# Patient Record
Sex: FEMALE | HISPANIC OR LATINO | URBAN - METROPOLITAN AREA
[De-identification: names, ages, dates, MRNs, and addresses within clinical notes are randomized per-mention and may not be internally consistent; named-entity substitution may affect disease eponyms.]

---

## 2018-08-29 ENCOUNTER — APPOINTMENT (OUTPATIENT)
Age: 58
Setting detail: DERMATOLOGY
End: 2018-08-29

## 2018-08-29 DIAGNOSIS — Z85.828 PERSONAL HISTORY OF OTHER MALIGNANT NEOPLASM OF SKIN: ICD-10-CM

## 2018-08-29 DIAGNOSIS — Z71.89 OTHER SPECIFIED COUNSELING: ICD-10-CM

## 2018-08-29 DIAGNOSIS — L81.4 OTHER MELANIN HYPERPIGMENTATION: ICD-10-CM

## 2018-08-29 DIAGNOSIS — Z86.006 PERSONAL HISTORY OF MELANOMA IN-SITU: ICD-10-CM

## 2018-08-29 PROCEDURE — OTHER COUNSELING: OTHER

## 2018-08-29 PROCEDURE — OTHER MIPS QUALITY: OTHER

## 2018-08-29 PROCEDURE — OTHER REASSURANCE: OTHER

## 2018-08-29 PROCEDURE — OTHER OBSERVATION: OTHER

## 2018-08-29 ASSESSMENT — LOCATION SIMPLE DESCRIPTION DERM
LOCATION SIMPLE: LEFT PRETIBIAL REGION
LOCATION SIMPLE: RIGHT FOREARM
LOCATION SIMPLE: LEFT FOREARM
LOCATION SIMPLE: NOSE

## 2018-08-29 ASSESSMENT — LOCATION ZONE DERM
LOCATION ZONE: LEG
LOCATION ZONE: NOSE
LOCATION ZONE: ARM

## 2018-08-29 ASSESSMENT — LOCATION DETAILED DESCRIPTION DERM
LOCATION DETAILED: LEFT DISTAL DORSAL FOREARM
LOCATION DETAILED: LEFT PROXIMAL PRETIBIAL REGION
LOCATION DETAILED: RIGHT DISTAL DORSAL FOREARM
LOCATION DETAILED: NASAL DORSUM

## 2018-08-29 NOTE — PROCEDURE: MIPS QUALITY
Quality 226: Preventive Care And Screening: Tobacco Use: Screening And Cessation Intervention: Patient screened for tobacco and is an ex-smoker
Quality 138: Melanoma: Coordination Of Care: A treatment plan was communicated to the physicians providing continuing care within one month of diagnosis outlining: diagnosis, tumor thickness and a plan for surgery or alternate care.
Quality 224: Stage 0-Iic Melanoma: Overutilization Of Imaging Studies For Only Stage 0-Iic Melanoma: None of the following diagnostic imaging studies ordered: chest X-ray, CT, Ultrasound, MRI, PET, or nuclear medicine scans (ML)
Quality 110: Preventive Care And Screening: Influenza Immunization: Influenza immunization was not ordered or administered, reason not given
Quality 397: Melanoma: Reporting: The pathology report includes a pT Category and statement on thickness and ulceration for pT1, mitotic rate.
Detail Level: Detailed
Quality 137: Melanoma: Continuity Of Care - Recall System: Patient information entered into a recall system that includes: target date for the next exam specified AND a process to follow up with patients regarding missed or unscheduled appointments
Quality 431: Preventive Care And Screening: Unhealthy Alcohol Use - Screening: Patient screened for unhealthy alcohol use using a single question and scores less than 2 times per year
Quality 130: Documentation Of Current Medications In The Medical Record: Current Medications Documented

## 2018-08-29 NOTE — PROCEDURE: REASSURANCE
Hide Include Location In Plan Question?: No
Include Location In Plan?: Yes
Detail Level: Generalized

## 2021-01-06 ENCOUNTER — OFFICE VISIT (OUTPATIENT)
Dept: INTERNAL MEDICINE CLINIC | Age: 61
End: 2021-01-06
Payer: COMMERCIAL

## 2021-01-06 ENCOUNTER — DOCUMENTATION ONLY (OUTPATIENT)
Dept: INTERNAL MEDICINE CLINIC | Age: 61
End: 2021-01-06

## 2021-01-06 VITALS
HEART RATE: 50 BPM | HEIGHT: 69 IN | DIASTOLIC BLOOD PRESSURE: 81 MMHG | RESPIRATION RATE: 16 BRPM | OXYGEN SATURATION: 98 % | SYSTOLIC BLOOD PRESSURE: 130 MMHG | WEIGHT: 207.2 LBS | TEMPERATURE: 97.5 F | BODY MASS INDEX: 30.69 KG/M2

## 2021-01-06 DIAGNOSIS — Z23 ENCOUNTER FOR IMMUNIZATION: ICD-10-CM

## 2021-01-06 DIAGNOSIS — Z12.31 ENCOUNTER FOR SCREENING MAMMOGRAM FOR MALIGNANT NEOPLASM OF BREAST: ICD-10-CM

## 2021-01-06 DIAGNOSIS — Z00.00 ROUTINE GENERAL MEDICAL EXAMINATION AT A HEALTH CARE FACILITY: Primary | ICD-10-CM

## 2021-01-06 DIAGNOSIS — E78.5 HYPERLIPIDEMIA, UNSPECIFIED HYPERLIPIDEMIA TYPE: ICD-10-CM

## 2021-01-06 DIAGNOSIS — Z82.49 FAMILY HISTORY OF HEART DISEASE: ICD-10-CM

## 2021-01-06 PROCEDURE — 90750 HZV VACC RECOMBINANT IM: CPT | Performed by: PHYSICIAN ASSISTANT

## 2021-01-06 PROCEDURE — 99386 PREV VISIT NEW AGE 40-64: CPT | Performed by: PHYSICIAN ASSISTANT

## 2021-01-06 PROCEDURE — 90471 IMMUNIZATION ADMIN: CPT | Performed by: PHYSICIAN ASSISTANT

## 2021-01-06 NOTE — PROGRESS NOTES
Deberah Osgood is a 56 y.o. female (: 1960) presenting to address:    Chief Complaint   Patient presents with    New Patient    Establish Care       Vitals:    21 1120   BP: 130/81   Pulse: (!) 50   Resp: 16   Temp: 97.5 °F (36.4 °C)   TempSrc: Oral   SpO2: 98%   Weight: 207 lb 3.2 oz (94 kg)   Height: 5' 8.5\" (1.74 m)   PainSc:   0 - No pain       Hearing/Vision:   No exam data present    Learning Assessment:     Learning Assessment 2021   PRIMARY LEARNER Patient   HIGHEST LEVEL OF EDUCATION - PRIMARY LEARNER  > 4 YEARS OF COLLEGE   BARRIERS PRIMARY LEARNER NONE   CO-LEARNER CAREGIVER No   PRIMARY LANGUAGE ENGLISH   LEARNER PREFERENCE PRIMARY READING   ANSWERED BY patient   RELATIONSHIP SELF     Depression Screening:     3 most recent PHQ Screens 2021   Little interest or pleasure in doing things Not at all   Feeling down, depressed, irritable, or hopeless Not at all   Total Score PHQ 2 0     Fall Risk Assessment:   No flowsheet data found. Abuse Screening:   No flowsheet data found. Coordination of Care Questionaire:   1. Have you been to the ER, urgent care clinic since your last visit? Hospitalized since your last visit? NO    2. Have you seen or consulted any other health care providers outside of the [address] [place] [address] since your last visit? Include any pap smears or colon screening. NO    Advanced Directive:   1. Do you have an Advanced Directive? NO    2. Would you like information on Advanced Directives?  NO

## 2021-01-06 NOTE — PROGRESS NOTES
HISTORY OF PRESENT ILLNESS  Attila Escobar is a 56 y.o. female. HPI  Presents as a new patient to establish care. She will request her prior records. She exercises, monitors her diet, and works to maintain health. She admits to needing to lose some weight. 1) Hx HLD - no medication. She has maintained this thus far with lifestyle. She wishes to recheck her status. Admits to a high fam hx of CAD -- multiple siblings with CAD (stents, [place])    2) Mammogram - 2/3/2020 - [address] [facility] [address], [facility]. Due for upcoming repeat. 3) Hx of smoking - smokes 2-3 cigs/day x ~30 years. Quit ~10 years ago. Inquires about low-dose CT chest screening. Review of Systems   Constitutional: Negative for malaise/fatigue. Respiratory: Negative for shortness of breath. Cardiovascular: Negative for chest pain and leg swelling. Neurological: Negative for dizziness and headaches. Visit Vitals  /81 (BP 1 Location: Right arm, BP Patient Position: Sitting)   Pulse (!) 50   Temp 97.5 °F (36.4 °C) (Oral)   Resp 16   Ht 5' 8.5\" (1.74 m)   Wt 207 lb 3.2 oz (94 kg)   SpO2 98%   BMI 31.05 kg/m²       Physical Exam  Constitutional:       General: She is not in acute distress. Appearance: Normal appearance. She is well-developed. HENT:      Head: Normocephalic and atraumatic. Right Ear: Tympanic membrane, ear canal and external ear normal.      Left Ear: Tympanic membrane, ear canal and external ear normal.      Nose: Nose normal.      Mouth/Throat:      Comments: Mask  Eyes:      General: No scleral icterus. Conjunctiva/sclera: Conjunctivae normal.      Pupils: Pupils are equal, round, and reactive to light. Neck:      Musculoskeletal: Neck supple. Cardiovascular:      Rate and Rhythm: Normal rate and regular rhythm. Pulses: Normal pulses. Dorsalis pedis pulses are 2+ on the right side and 2+ on the left side.         Posterior tibial pulses are 2+ on the right side and 2+ on the left side. Heart sounds: Normal heart sounds. No murmur. No gallop. Pulmonary:      Effort: Pulmonary effort is normal. No respiratory distress. Breath sounds: Normal breath sounds. No decreased breath sounds, wheezing, rhonchi or rales. Musculoskeletal:      Right lower leg: No edema. Left lower leg: No edema. Lymphadenopathy:      Head:      Right side of head: No submandibular or tonsillar adenopathy. Left side of head: No submandibular or tonsillar adenopathy. Cervical: No cervical adenopathy. Upper Body:      Right upper body: No supraclavicular adenopathy. Left upper body: No supraclavicular adenopathy. Skin:     General: Skin is warm and dry. Neurological:      Mental Status: She is alert and oriented to person, place, and time. Psychiatric:         Speech: Speech normal.         ASSESSMENT and PLAN  Diagnoses and all orders for this visit:    1. Routine general medical examination at a health care facility  -     CBC W/O DIFF; Future  -     METABOLIC PANEL, COMPREHENSIVE; Future  -     HEMOGLOBIN A1C W/O EAG; Future  -     TSH 3RD GENERATION; Future  -     URINALYSIS W/ RFLX MICROSCOPIC; Future  - Record request to prior PCP made. 2. Hyperlipidemia, unspecified hyperlipidemia type  -     LIPID PANEL; Future  -     CRP, HIGH SENSITIVITY; Future  - Will review labs, calculate risk, and consider need of medication. 3. Family history of heart disease  -     CRP, HIGH SENSITIVITY; Future    4. Encounter for screening mammogram for malignant neoplasm of breast  -     [facility]IA [facility]ABEL MAMMO BI SCREENING INCL CAD; Future   - After 2/3/21.    5. Encounter for immunization  -     ZOSTER VACC RECOMBINANT ADJUVANTED  -     CT IMMUNIZ ADMIN,1 SINGLE/COMB VAC/TOXOID      Follow-up and Dispositions    · Return for Schedule WWE.

## 2021-01-07 LAB
A-G RATIO,AGRAT: 2.1 RATIO (ref 1.1–2.6)
ALBUMIN SERPL-MCNC: 4.9 G/DL (ref 3.5–5)
ALP SERPL-CCNC: 73 U/L (ref 40–120)
ALT SERPL-CCNC: 38 U/L (ref 5–40)
ANION GAP SERPL CALC-SCNC: 11 MMOL/L (ref 3–15)
AST SERPL W P-5'-P-CCNC: 30 U/L (ref 10–37)
AVG GLU, 10930: 109 MG/DL (ref 91–123)
BILIRUB SERPL-MCNC: 0.3 MG/DL (ref 0.2–1.2)
BILIRUB UR QL: NEGATIVE
BUN SERPL-MCNC: 15 MG/DL (ref 6–22)
C-REACTIVE PROTEIN, CARDIAC, 120768: 3.45 MG/L
CALCIUM SERPL-MCNC: 9.8 MG/DL (ref 8.4–10.5)
CHLORIDE SERPL-SCNC: 102 MMOL/L (ref 98–110)
CHOLEST SERPL-MCNC: 270 MG/DL (ref 110–200)
CLARITY: CLEAR
CO2 SERPL-SCNC: 28 MMOL/L (ref 20–32)
COLOR UR: YELLOW
CREAT SERPL-MCNC: 0.7 MG/DL (ref 0.8–1.4)
ERYTHROCYTE [DISTWIDTH] IN BLOOD BY AUTOMATED COUNT: 12.9 % (ref 10–15.5)
GFRAA, 66117: >60
GFRNA, 66118: >60
GLOBULIN,GLOB: 2.3 G/DL (ref 2–4)
GLUCOSE SERPL-MCNC: 96 MG/DL (ref 70–99)
GLUCOSE UR QL: NEGATIVE MG/DL
HBA1C MFR BLD HPLC: 5.4 % (ref 4.8–5.6)
HCT VFR BLD AUTO: 43.2 % (ref 35.1–48)
HDLC SERPL-MCNC: 4.2 MG/DL (ref 0–5)
HDLC SERPL-MCNC: 64 MG/DL
HGB BLD-MCNC: 13.3 G/DL (ref 11.7–16)
HGB UR QL STRIP: NEGATIVE
KETONES UR QL STRIP.AUTO: NEGATIVE MG/DL
LDL/HDL RATIO,LDHD: 2.9
LDLC SERPL CALC-MCNC: 186 MG/DL (ref 50–99)
LEUKOCYTE ESTERASE: NEGATIVE
MCH RBC QN AUTO: 28 PG (ref 26–34)
MCHC RBC AUTO-ENTMCNC: 31 G/DL (ref 31–36)
MCV RBC AUTO: 92 FL (ref 81–99)
NITRITE UR QL STRIP.AUTO: NEGATIVE
NON-HDL CHOLESTEROL, 011976: 206 MG/DL
PH UR STRIP: 6 PH (ref 5–8)
PLATELET # BLD AUTO: 280 K/UL (ref 140–440)
PMV BLD AUTO: 11.2 FL (ref 9–13)
POTASSIUM SERPL-SCNC: 4.7 MMOL/L (ref 3.5–5.5)
PROT SERPL-MCNC: 7.2 G/DL (ref 6.2–8.1)
PROT UR QL STRIP: NEGATIVE MG/DL
RBC # BLD AUTO: 4.69 M/UL (ref 3.8–5.2)
SODIUM SERPL-SCNC: 141 MMOL/L (ref 133–145)
SP GR UR: 1.01 (ref 1–1.03)
TRIGL SERPL-MCNC: 97 MG/DL (ref 40–149)
TSH SERPL DL<=0.005 MIU/L-ACNC: 2.98 MCU/ML (ref 0.27–4.2)
URINE ASCORBIC ACID: NEGATIVE MG/DL
UROBILINOGEN UR STRIP-MCNC: <2 MG/DL
VLDLC SERPL CALC-MCNC: 19 MG/DL (ref 8–30)
WBC # BLD AUTO: 4.8 K/UL (ref 4–11)

## 2021-01-13 ENCOUNTER — TELEPHONE (OUTPATIENT)
Dept: INTERNAL MEDICINE CLINIC | Age: 61
End: 2021-01-13

## 2021-01-13 NOTE — TELEPHONE ENCOUNTER
Called patient and discussed results. Given elevated LDL, non-HDL, and hs-CRP, I strongly recommend statin medication for prevention of CAD, particularly in lieu of patient's family history. Lab was nonfasting, and patient has just implemented dietary changes for weight loss. She would like to recheck a fasting lipid panel soon along with weight loss prior to medication initiation. However, she will consider the medication. If LDL remains elevated, she will proceed with medication. We agree to recheck a fasting lipid in March (2 months). She will let me know if she would like to reconsider medication or if she would like to repeat a fasting panel sooner.

## 2021-04-30 ENCOUNTER — OFFICE VISIT (OUTPATIENT)
Dept: INTERNAL MEDICINE CLINIC | Age: 61
End: 2021-04-30
Payer: COMMERCIAL

## 2021-04-30 VITALS
HEIGHT: 69 IN | SYSTOLIC BLOOD PRESSURE: 122 MMHG | DIASTOLIC BLOOD PRESSURE: 80 MMHG | HEART RATE: 54 BPM | RESPIRATION RATE: 18 BRPM | OXYGEN SATURATION: 97 % | BODY MASS INDEX: 29.77 KG/M2 | WEIGHT: 201 LBS | TEMPERATURE: 97.1 F

## 2021-04-30 DIAGNOSIS — Z82.49 FAMILY HISTORY OF CORONARY ARTERY DISEASE IN BROTHER: ICD-10-CM

## 2021-04-30 DIAGNOSIS — Z12.4 SCREENING FOR MALIGNANT NEOPLASM OF CERVIX: ICD-10-CM

## 2021-04-30 DIAGNOSIS — R06.09 DOE (DYSPNEA ON EXERTION): ICD-10-CM

## 2021-04-30 DIAGNOSIS — Z23 ENCOUNTER FOR IMMUNIZATION: ICD-10-CM

## 2021-04-30 DIAGNOSIS — E78.5 HYPERLIPIDEMIA, UNSPECIFIED HYPERLIPIDEMIA TYPE: ICD-10-CM

## 2021-04-30 DIAGNOSIS — Z01.419 WELL WOMAN EXAM WITH ROUTINE GYNECOLOGICAL EXAM: Primary | ICD-10-CM

## 2021-04-30 PROCEDURE — 90750 HZV VACC RECOMBINANT IM: CPT | Performed by: PHYSICIAN ASSISTANT

## 2021-04-30 PROCEDURE — 90471 IMMUNIZATION ADMIN: CPT | Performed by: PHYSICIAN ASSISTANT

## 2021-04-30 PROCEDURE — 99396 PREV VISIT EST AGE 40-64: CPT | Performed by: PHYSICIAN ASSISTANT

## 2021-04-30 RX ORDER — ROSUVASTATIN CALCIUM 10 MG/1
10 TABLET, COATED ORAL
Qty: 90 TAB | Refills: 3 | Status: SHIPPED | OUTPATIENT
Start: 2021-04-30 | End: 2022-05-23

## 2021-04-30 NOTE — PROGRESS NOTES
Andrea Ingram is a 56 y.o. female (: 1960) presenting to address:    Chief Complaint   Patient presents with    Well Woman       Vitals:    21 0930   BP: 122/80   Pulse: (!) 54   Resp: 18   Temp: 97.1 °F (36.2 °C)   TempSrc: Oral   SpO2: 97%   Weight: 201 lb (91.2 kg)   Height: 5' 8.5\" (1.74 m)   PainSc:   0 - No pain       Hearing/Vision:   No exam data present    Learning Assessment:     Learning Assessment 2021   PRIMARY LEARNER Patient   HIGHEST LEVEL OF EDUCATION - PRIMARY LEARNER  > 4 YEARS OF COLLEGE   BARRIERS PRIMARY LEARNER NONE   CO-LEARNER CAREGIVER No   PRIMARY LANGUAGE ENGLISH   LEARNER PREFERENCE PRIMARY READING   ANSWERED BY patient   RELATIONSHIP SELF     Depression Screening:     3 most recent PHQ Screens 2021   Little interest or pleasure in doing things Not at all   Feeling down, depressed, irritable, or hopeless Not at all   Total Score PHQ 2 0     Fall Risk Assessment:   No flowsheet data found. Abuse Screening:   No flowsheet data found. Coordination of Care Questionaire:   1. Have you been to the ER, urgent care clinic since your last visit? Hospitalized since your last visit? NO    2. Have you seen or consulted any other health care providers outside of the [address] [place] [address] since your last visit? Include any pap smears or colon screening. YES ENT    Advanced Directive:   1. Do you have an Advanced Directive? NO    2. Would you like information on Advanced Directives?  NO

## 2021-04-30 NOTE — PROGRESS NOTES
Subjective:   56 y.o. female for Well Woman Check. No LMP recorded. Patient is postmenopausal.    Social History: single partner, contraception - none. Pertinent past medical hstory: HLD - she is ready to move forward with medication. See previous note. Fam hx of her brother with CAD -  from massive MI early 63's. Admits to WRIGHT at times. She does exercise daily and admits WRIGHT is worse some times. Denies chest pain. Upcoming mammogram: 21 - [address]  Colonoscopy - reportedly completed in 2018. Will request record. Consulted with [facility]. Past Medical History:   Diagnosis Date    Basal cell adenocarcinoma     nose    Melanoma in situ ([address]     L leg - monitored by TIFFANY Samuel        ROS:  Feeling well. No dyspnea or chest pain on exertion. No abdominal pain, change in bowel habits, black or bloody stools. No urinary tract symptoms. GYN ROS: no breast pain or new or enlarging lumps on self exam, no vaginal bleeding, no discharge or pelvic pain, she complains of vaginal dryness but well-manages this with Vit E vaginal tabs. No neurological complaints. Objective:     Visit Vitals  /80 (BP 1 Location: Right upper arm, BP Patient Position: Sitting, BP Cuff Size: Adult)   Pulse (!) 54   Temp 97.1 °F (36.2 °C) (Oral)   Resp 18   Ht 5' 8.5\" (1.74 m)   Wt 201 lb (91.2 kg)   SpO2 97%   BMI 30.12 kg/m²     The patient appears well, alert, oriented x 3, in no distress. ENT normal.  Neck supple. No adenopathy or thyromegaly. JESSICA. Lungs are clear, good air entry, no wheezes, rhonchi or rales. S1 and S2 normal, no murmurs, regular rate and rhythm. Abdomen soft without tenderness, guarding, mass or organomegaly. Extremities show no edema, normal peripheral pulses. Neurological is normal, no focal findings.     BREAST EXAM: breasts appear normal, no suspicious masses, no skin or nipple changes or axillary nodes    PELVIC EXAM: VULVA: normal appearing vulva with no masses, tenderness or lesions, VAGINA: normal appearing vagina with normal color and discharge, no lesions, CERVIX: normal appearing cervix without discharge or lesions, ectropion of os, UTERUS: uterus is normal size, shape, consistency and nontender, ADNEXA: normal adnexa in size, nontender and no masses, PAP: Pap smear done today, thin-prep method, HPV test, STD screen deferred, exam chaperoned by Ludin Burch MA. Assessment/Plan:   well woman  mammogram  pap smear  counseled on breast self exam and mammography screening  return annually or prn  Diagnoses and all orders for this visit:    1. Well woman exam with routine gynecological exam  Comments:  [R22.31]  2. Screening for malignant neoplasm of cervix  -     PAP IG, APTIMA HPV AND RFX 16/18,45 ([id number]); Future    3. Hyperlipidemia, unspecified hyperlipidemia type  -     LIPID PANEL; Future  -     CRP, HIGH SENSITIVITY; Future  -     rosuvastatin (CRESTOR) 10 mg tablet; Take 1 Tab by mouth nightly. - Start medication. Repeat lab 3 months - orders placed. 4. WRIGHT (dyspnea on exertion)  5. Family history of coronary artery disease in brother  -     ECHO STRESS; Future    6. Encounter for immunization  -     ZOSTER VACC RECOMBINANT ADJUVANTED  -     MD IMMUNIZ ADMIN,1 SINGLE/COMB VAC/TOXOID        Follow-up and Dispositions    · Return in about 3 months (around 7/30/2021) for f/u cholesterol . Justice Archer

## 2021-05-03 LAB
HPV HIGH RISK, 507303: NEGATIVE
PAP IMAGE GUIDED, 8900296: NORMAL

## 2021-05-07 ENCOUNTER — HOSPITAL ENCOUNTER (OUTPATIENT)
Dept: WOMENS IMAGING | Age: 61
Discharge: HOME | End: 2021-05-07
Attending: PHYSICIAN ASSISTANT
Payer: COMMERCIAL

## 2021-05-07 DIAGNOSIS — Z12.31 ENCOUNTER FOR SCREENING MAMMOGRAM FOR MALIGNANT NEOPLASM OF BREAST: ICD-10-CM

## 2021-05-07 DIAGNOSIS — Z12.31 VISIT FOR SCREENING MAMMOGRAM: ICD-10-CM

## 2021-05-07 PROCEDURE — 77063 BREAST TOMOSYNTHESIS BI: CPT

## 2021-07-12 ENCOUNTER — HOSPITAL ENCOUNTER (OUTPATIENT)
Dept: LAB | Age: 61
Discharge: HOME | End: 2021-07-12

## 2021-07-12 LAB — SENTARA SPECIMEN COL,SENBCF: NORMAL

## 2021-07-12 PROCEDURE — 99001 SPECIMEN HANDLING PT-LAB: CPT

## 2021-07-14 ENCOUNTER — VIRTUAL VISIT (OUTPATIENT)
Dept: INTERNAL MEDICINE CLINIC | Age: 61
End: 2021-07-14
Payer: COMMERCIAL

## 2021-07-14 DIAGNOSIS — E78.5 HYPERLIPIDEMIA, UNSPECIFIED HYPERLIPIDEMIA TYPE: Primary | ICD-10-CM

## 2021-07-14 PROCEDURE — 99213 OFFICE O/P EST LOW 20 MIN: CPT | Performed by: PHYSICIAN ASSISTANT

## 2021-07-14 NOTE — PROGRESS NOTES
For virtual visit patient would like to receive link via TEXT/EMAIL: ham Escobedo is a 56 y.o. female (: 1960) evaluated via telephone on 2021 to address:    Chief Complaint   Patient presents with    Cholesterol Problem       There were no vitals filed for this visit. Allergies Reviewed. Learning Assessment:     Learning Assessment 2021   PRIMARY LEARNER Patient   HIGHEST LEVEL OF EDUCATION - PRIMARY LEARNER  > 4 YEARS OF COLLEGE   BARRIERS PRIMARY LEARNER NONE   CO-LEARNER CAREGIVER No   PRIMARY LANGUAGE ENGLISH   LEARNER PREFERENCE PRIMARY READING   ANSWERED BY patient   RELATIONSHIP SELF     Depression Screening:     3 most recent PHQ Screens 2021   Little interest or pleasure in doing things Not at all   Feeling down, depressed, irritable, or hopeless Not at all   Total Score PHQ 2 0     Fall Risk Assessment:   No flowsheet data found. Abuse Screening:   No flowsheet data found. Coordination of Care Questionaire:   1. Have you been to the ER, urgent care clinic since your last visit? Hospitalized since your last visit? NO    2. Have you seen or consulted any other health care providers outside of the [address] [place] [address] since your last visit? Include any pap smears or colon screening. NO    Advanced Directive:   1. Do you have an Advanced Directive? NO    2. Would you like information on Advanced Directives?  NO

## 2021-07-14 NOTE — PROGRESS NOTES
Simone Dial is a 56 y.o. female who was seen by synchronous (real-time) audio-video technology on 7/14/2021 for Cholesterol Problem        Assessment & Plan:     Diagnoses and all orders for this visit:    1. Hyperlipidemia, unspecified hyperlipidemia type  - Very pleased with drastic improvement with Crestor 10 mg. Discussed with patient that due to her family hx, starting lipids, and starting hs-CRP, recommend consideration of dosage increase to 20 mg. She would prefer to continue with her current dosage and re-evaluate this at her 1/2022 CPE. She has Crestor 10 mg refills available to her. Follow-up and Dispositions    · Return in about 6 months (around 1/14/2022) for Schedule CPE. Subjective:     Presents for follow-up hyperlipidemia. Received Crestor 10 mg at her 4/30/21 visit but admits only started this 3 weeks ago. Recently repeated her labs. LDL, non-HDL drastically improved. Hs-CRP also improved - not in intermediate risk range. High fam hx CAD. Hospital Outpatient Visit on 07/12/2021   Component Date Value Ref Range Status    [facility] SPECIMEN COL 07/12/2021 Specimens collected/[facility] to [facility]    Final   Telephone on 07/02/2021   Component Date Value Ref Range Status    C-Reactive Protein, Cardiac 07/12/2021 2.27  mg/L Final    Comment: Consensus reference interval for adults: <5 mg/L    The CDC/AHA recommended the following hsCRP cut-off points (tertiles) for CVD  risk assessment:    < 1.0 mg/L           Low Risk  1.0 - 3.0 mg/ L      Intermediate Risk  >3.0 mg/L            High Risk    Increases in CRP values are non-specific and should not be interpreted without  a complete clinical history. When using hsCRP to assess the risk of coronary heart disease, measurements  should be made on metabolically stable patients and compared to previous  values. Optimally, the average of hsCRP results repeated two weeks apart   should  be used for risk assessment.  When the results are being used for risk  assessment, patients with persistently unexplained hsCRP levels of above 10  mg/L should be evaluated for non cardiovascular origins. Testing for any risk  assessment should not be performed while there is indication of infection,  systemic inflammation or trauma.  AST (SGOT) 07/12/2021 23  10 - 37 U/L Final    ALT (SGPT) 07/12/2021 29  5 - 40 U/L Final    Comment: Test includes ALT, Alkaline Phosphatase, AST, Total Protein, Direct Bilirubin,  Total Bilirubin and Albumin.  Alk. phosphatase 07/12/2021 58  40 - 120 U/L Final    Bilirubin, total 07/12/2021 0.6  0.2 - 1.2 mg/dL Final    Bilirubin, direct 07/12/2021 <0.2  0.0 - 0.3 mg/dL Final    Protein, total 07/12/2021 6.8  6.2 - 8.1 g/dL Final    Albumin 07/12/2021 4.8  3.5 - 5.0 g/dL Final    A-G Ratio 07/12/2021 2.4  1.1 - 2.6 ratio Final    Globulin 07/12/2021 2.0  2.0 - 4.0 g/dL Final    Triglyceride 07/12/2021 72  40 - 149 mg/dL Final    HDL Cholesterol 07/12/2021 58  >=40 mg/dL Final    Cholesterol, total 07/12/2021 167  110 - 200 mg/dL Final    CHOLESTEROL/HDL 07/12/2021 2.9  0.0 - 5.0 Final    Non-HDL Cholesterol 07/12/2021 109  <130 mg/dL Final    LDL, calculated 07/12/2021 95  50 - 99 mg/dL Final    VLDL, calculated 07/12/2021 14  8 - 30 mg/dL Final    LDL/HDL Ratio 07/12/2021 1.6   Final    Comment: Test includes cholesterol, HDL cholesterol, triglycerides and LDL. Cholesterol Recommended NCEP guidelines in mg/dL:    Less than 200            Desirable  200 - 239                Borderline High  Greater than or  = 240   High      Please Note:  Total Chol/HDL Ratio                     Men     Women  1/2 Avg. Risk    3.4     3.3      Avg. Risk    5.0     4.4  2X  Avg. Risk    9.6     7.1  3X  Avg. Risk   23.4    11.0                 Prior to Admission medications    Medication Sig Start Date End Date Taking? Authorizing Provider   COLLAGEN by Does Not Apply route.    Yes Provider, Historical   rosuvastatin (CRESTOR) 10 mg tablet Take 1 Tab by mouth nightly. 4/30/21  Yes TIFFANY Arguelles   OTHER    Yes Provider, Historical      Past Medical History:   Diagnosis Date    Basal cell adenocarcinoma     nose    Melanoma in situ ([facility] [address]     L leg - monitored by TIFFANY Arnold       ROS    Objective:     Patient-Reported Vitals 7/14/2021   Patient-Reported Weight 196lbs        [INSTRUCTIONS:  \"[x]\" Indicates a positive item  \"[]\" Indicates a negative item  -- DELETE ALL ITEMS NOT EXAMINED]    Constitutional: [x] Appears well-developed and well-nourished [x] No apparent distress      [] Abnormal -     Mental status: [x] Alert and awake  [x] Oriented to person/place/time [x] Able to follow commands    [] Abnormal -     Eyes:   EOM    [x]  Normal    [] Abnormal -   Sclera  [x]  Normal    [] Abnormal -          Discharge [x]  None visible   [] Abnormal -     HENT: [x] Normocephalic, atraumatic  [] Abnormal -   [x] Mouth/Throat: Mucous membranes are moist    External Ears [x] Normal  [] Abnormal -    Neck: [x] No visualized mass [] Abnormal -     Pulmonary/Chest: [x] Respiratory effort normal   [x] No visualized signs of difficulty breathing or respiratory distress        [] Abnormal -      Musculoskeletal:   [x] Normal gait with no signs of ataxia         [] Normal range of motion of neck        [] Abnormal -     Neurological:        [x] No Facial Asymmetry (Cranial nerve 7 motor function) (limited exam due to video visit)          [x] No gaze palsy        [] Abnormal -          Skin:        [x] No significant exanthematous lesions or discoloration noted on facial skin         [] Abnormal -            Psychiatric:       [x] Normal Affect [] Abnormal -        [x] No Hallucinations    Other pertinent observable physical exam findings:-        We discussed the expected course, resolution and complications of the diagnosis(es) in detail.   Medication risks, benefits, costs, interactions, and alternatives were discussed as indicated. I advised her to contact the office if her condition worsens, changes or fails to improve as anticipated. She expressed understanding with the diagnosis(es) and plan. Arti Dunham, was evaluated through a synchronous (real-time) audio-video encounter. The patient (or guardian if applicable) is aware that this is a billable service. Verbal consent to proceed has been obtained within the past 12 months. The visit was conducted pursuant to the emergency declaration under the [address] [place] [address], [address] [place] [address] authority and the [facility] General Act. Patient identification was verified, and a caregiver was present when appropriate. The patient was located in a state where the provider was credentialed to provide care.       TIFFANY Jimenez

## 2022-04-18 ENCOUNTER — TRANSCRIBE ORDER (OUTPATIENT)
Dept: SCHEDULING | Age: 62
End: 2022-04-18

## 2022-04-18 DIAGNOSIS — Z12.31 SCREENING MAMMOGRAM, ENCOUNTER FOR: Primary | ICD-10-CM

## 2023-06-26 RX ORDER — ROSUVASTATIN CALCIUM 10 MG/1
TABLET, COATED ORAL
Qty: 90 TABLET | OUTPATIENT
Start: 2023-06-26

## 2024-04-30 NOTE — PROGRESS NOTES
CC: Annual exam     HISTORY OF PRESENT ILLNESS:  Domi is a 23 year old       female, seen today for annual gyn exam.  She has no questions/concerns. She is a new patient to this practice. She does report long standing hair loss since high school. She would like some lab work for this.      Breast cancer screening other than mammogram    2024      Comment: Lifetime risk 11.79% average risk    ADENOIDECTOMY W/ MYRINGOTOMY AND TUBES                        TONSILLECTOMY AND ADENOIDECTOMY                                 Comment: age 6 years  Current Outpatient Medications   Medication Sig Dispense Refill    Romero Fe  1-20 MG-MCG per tablet Take 1 tablet by mouth daily. 84 tablet 4    vitamin - therapeutic multivitamins w/minerals (CENTRUM SILVER,THERA-M) TABS Take 1 tablet by mouth daily.       No current facility-administered medications for this visit.     ALLERGIES:  No Known Allergies    Social History     Socioeconomic History    Marital status: Single     Spouse name: Not on file    Number of children: Not on file    Years of education: Not on file    Highest education level: Not on file   Occupational History    Not on file   Tobacco Use    Smoking status: Never     Passive exposure: Yes    Smokeless tobacco: Never   Vaping Use    Vaping status: never used   Substance and Sexual Activity    Alcohol use: Not on file    Drug use: Never    Sexual activity: Yes     Partners: Male     Birth control/protection: Pill   Other Topics Concern    Not on file   Social History Narrative    Not on file     Social Determinants of Health     Financial Resource Strain: Not on file   Food Insecurity: Not on file   Transportation Needs: Not on file   Physical Activity: Not on file   Stress: Not on file   Social Connections: Not on file   Interpersonal Safety: Not on file      Family History   Problem Relation Age of Onset    Hyperlipidemia Father     Liver Disease Maternal Aunt     Cancer Maternal Grandmother       Shingrix Immunization/s administered 4/30/2021 by Joann Conley with consent. Patient tolerated procedure well. No reactions noted.    breast ca mggm    Diabetes Paternal Grandmother     Diabetes Maternal Grandfather     Heart disease Maternal Grandfather     Cancer, Breast Neg Hx     Cancer, Colon Neg Hx     Cancer, Ovarian Neg Hx     Cancer, Endometrial Neg Hx        PAST GYNECOLOGIC HISTORY:  Patient's last menstrual period was 2024.. Menses are regular , last for 5 days in duration with a normal amount of flow. The patient uses pills for contraception. Last Pap smear was . Patient reports Pap smears have been normal. Regarding sexual activity, she is in a monogamous relationship. The patient denies pain with intercourse or vaginal discharge.    PAST OBSTETRICAL HISTORY:         .      HEALTH MAINTENANCE:  Mammogram n/a  Colonoscopy n/a  Immunizations tdap 2009  DEXA n/a  Screening labs ordered today    REVIEW OF SYSTEMS:    Negative for any significant problems with the following:    General: unexplained fevers or chills  Cardiovascular: chest pain, palpitations or edema  Respiratory: shortness of breath  Breasts: dominant masses or nipple discharge  GI: nausea/vomiting, diarrhea/constipation  Urinary: dysuria/hematuria  Reproductive: vaginal discharge, burning, odor or itching  Skin: new or changing nevi  Neuro: headaches  Musculoskeletal: new aches or pains    Patient consented to exam. This was done with Yancy Razo as chaperone    PHYSICAL EXAM:  Blood pressure 100/60, height 5' 3\" (1.6 m), weight 62.5 kg (137 lb 12.8 oz), last menstrual period 2024., Body mass index is 24.41 kg/m²., Patient's last menstrual period was 2024.  General: well developed, well nourished, in no acute distress  Psych: alert and cooperative; normal mood and affect  HEENT: atraumatic, normocephalic  Respiratory: Unlabored respiratory effort.  Breasts:  Symmetric.  There are no skin changes, dominant masses, nipple discharge, or axillary lymphadenopathy bilaterally.  Abdomen: abdomen soft and non-tender without masses,  hepatosplenomegaly or hernias noted  Neurologic: no focal deficits   Skin: warm and dry without lesions  Extremities: without edema or cyanosis  Pelvic Exam:  EXTERNAL: Normal female external genitalia, no lesions, normal hair distribution.  URETHRAL MEATUS: Normal-appearing urethra without lesions or prolapse.  URETHRA: No masses or tenderness.   BLADDER: Nontender. Nondistended.  VAGINA: Rugated. Normal physiologic discharge. No lesions. Well supported.  CERVIX: Appears to be normal with out any gross lesions.  UTERUS: Small, firm and nontender, in mid position.  ADNEXA: No masses, enlargement, or tenderness.  ANUS AND PERINEUM: No lesions.    Review Flowsheet  More data may exist         4/30/2024   PHQ 2/9 Score   Adult PHQ 2 Score 0 0   Adult PHQ 2 Interpretation No further screening needed No further screening needed   Little interest or pleasure in activity? Not at all Not at all   Feeling down, depressed or hopeless? Not at all Not at all       DEPRESSION ASSESSMENT/PLAN:  Depression screening is negative no further plan needed.    ASSESSMENT:    22 y/o female presenting for annual exam    PLAN:    Pap guidelines discussed. Pap will be due in 2026. Anticipatory guidance appropriate for age reviewed. Tdap declined. Breast cancer screening average risk. Refill provided on birth control pills. Declines STD testing.     Follow up in 1 year or as needed